# Patient Record
Sex: MALE | Race: WHITE
[De-identification: names, ages, dates, MRNs, and addresses within clinical notes are randomized per-mention and may not be internally consistent; named-entity substitution may affect disease eponyms.]

---

## 2017-02-03 ENCOUNTER — HOSPITAL ENCOUNTER (OUTPATIENT)
Dept: HOSPITAL 58 - LAB | Age: 27
Discharge: HOME | End: 2017-02-03
Attending: NURSE PRACTITIONER

## 2017-02-03 DIAGNOSIS — E11.9: Primary | ICD-10-CM

## 2017-02-03 PROCEDURE — 36415 COLL VENOUS BLD VENIPUNCTURE: CPT

## 2017-02-03 PROCEDURE — 83036 HEMOGLOBIN GLYCOSYLATED A1C: CPT

## 2017-09-06 ENCOUNTER — HOSPITAL ENCOUNTER (OUTPATIENT)
Dept: HOSPITAL 58 - LAB | Age: 27
Discharge: HOME | End: 2017-09-06
Attending: NURSE PRACTITIONER

## 2017-09-06 DIAGNOSIS — F32.9: ICD-10-CM

## 2017-09-06 DIAGNOSIS — E11.9: Primary | ICD-10-CM

## 2017-09-06 LAB
ALBUMIN SERPL-MCNC: 4.1 G/DL (ref 3.4–5)
ALBUMIN/GLOB SERPL: 1.28 {RATIO}
ALP SERPL-CCNC: 107 U/L (ref 50–136)
ALT SERPL-CCNC: 44 U/L (ref 12–78)
ANION GAP SERPL CALC-SCNC: 16.1 MMOL/L
AST SERPL-CCNC: 27 U/L (ref 15–37)
BILIRUB SERPL-MCNC: 0.34 MG/DL (ref 0–1.2)
BUN SERPL-MCNC: 9 MG/DL (ref 7–18)
BUN/CREAT SERPL: 10.97
CALCIUM SERPL-MCNC: 9.6 MG/DL (ref 8.2–10.2)
CHLORIDE SERPL-SCNC: 103 MMOL/L (ref 98–107)
CO2 BLD-SCNC: 26 MMOL/L (ref 21–32)
CREAT SERPL-MCNC: 0.82 MG/DL (ref 0.6–1.1)
GFR SERPLBLD BASED ON 1.73 SQ M-ARVRAT: 114 ML/MIN
GLOBULIN SER CALC-MCNC: 3.2 G/L
GLUCOSE SERPL-MCNC: 70 MG/DL (ref 70–100)
POTASSIUM SERPL-SCNC: 4.1 MMOL/L (ref 3.5–5.1)
PROT SERPL-MCNC: 7.3 G/DL (ref 6.4–8.2)
SODIUM SERPL-SCNC: 141 MMOL/L (ref 136–145)

## 2017-09-06 PROCEDURE — 36415 COLL VENOUS BLD VENIPUNCTURE: CPT

## 2017-09-06 PROCEDURE — 80053 COMPREHEN METABOLIC PANEL: CPT

## 2017-09-06 PROCEDURE — 83036 HEMOGLOBIN GLYCOSYLATED A1C: CPT

## 2017-11-29 ENCOUNTER — HOSPITAL ENCOUNTER (OUTPATIENT)
Dept: HOSPITAL 58 - RAD | Age: 27
Discharge: HOME | End: 2017-11-29
Attending: NURSE PRACTITIONER

## 2017-11-29 DIAGNOSIS — M79.672: Primary | ICD-10-CM

## 2017-11-29 NOTE — DI
EXAM: Three-view left foot 

  

COMPARISON: None 

  

HISTORY: Pain and swelling 

  

FINDINGS: There is no acute fracture or dislocation.  Alignment is anatomic.  Joint spaces are well p
reserved. There is a small calcaneal Achilles spur.  There is an incidental os peroneum seen. There m
ay be some minimal soft tissue swelling seen laterally. No unexpected radio-opaque foreign bodies. 

  

IMPRESSION: 

1.  No acute osseous abnormality. 

2.  Minimal soft tissue swelling laterally. 

3.  Small calcaneal Achilles spur.

## 2017-12-08 ENCOUNTER — HOSPITAL ENCOUNTER (OUTPATIENT)
Dept: HOSPITAL 58 - LAB | Age: 27
Discharge: HOME | End: 2017-12-08
Attending: NURSE PRACTITIONER

## 2017-12-08 DIAGNOSIS — E11.9: Primary | ICD-10-CM

## 2017-12-08 PROCEDURE — 36415 COLL VENOUS BLD VENIPUNCTURE: CPT

## 2017-12-08 PROCEDURE — 83036 HEMOGLOBIN GLYCOSYLATED A1C: CPT

## 2019-03-01 ENCOUNTER — HOSPITAL ENCOUNTER (OUTPATIENT)
Dept: HOSPITAL 58 - RHC-LAB | Age: 29
Discharge: HOME | End: 2019-03-01
Attending: NURSE PRACTITIONER

## 2019-03-01 DIAGNOSIS — E11.9: Primary | ICD-10-CM

## 2019-03-01 PROCEDURE — 80061 LIPID PANEL: CPT

## 2019-03-01 PROCEDURE — 85025 COMPLETE CBC W/AUTO DIFF WBC: CPT

## 2019-03-01 PROCEDURE — 80053 COMPREHEN METABOLIC PANEL: CPT

## 2019-03-01 PROCEDURE — 84681 ASSAY OF C-PEPTIDE: CPT

## 2019-03-01 PROCEDURE — 83036 HEMOGLOBIN GLYCOSYLATED A1C: CPT

## 2019-03-01 PROCEDURE — 36415 COLL VENOUS BLD VENIPUNCTURE: CPT

## 2019-03-26 ENCOUNTER — HOSPITAL ENCOUNTER (OUTPATIENT)
Dept: HOSPITAL 58 - LAB | Age: 29
Discharge: HOME | End: 2019-03-26
Attending: FAMILY MEDICINE

## 2019-03-26 DIAGNOSIS — E11.65: Primary | ICD-10-CM

## 2019-03-26 PROCEDURE — 83519 RIA NONANTIBODY: CPT

## 2019-03-26 PROCEDURE — 83525 ASSAY OF INSULIN: CPT

## 2019-03-26 PROCEDURE — 36415 COLL VENOUS BLD VENIPUNCTURE: CPT

## 2019-04-09 ENCOUNTER — HOSPITAL ENCOUNTER (OUTPATIENT)
Dept: HOSPITAL 58 - CAR | Age: 29
Discharge: HOME | End: 2019-04-09
Attending: FAMILY MEDICINE

## 2019-04-09 DIAGNOSIS — E11.9: Primary | ICD-10-CM

## 2019-04-09 DIAGNOSIS — I49.9: ICD-10-CM

## 2019-04-09 PROCEDURE — 93010 ELECTROCARDIOGRAM REPORT: CPT

## 2019-04-09 PROCEDURE — 93005 ELECTROCARDIOGRAM TRACING: CPT

## 2019-04-17 ENCOUNTER — HOSPITAL ENCOUNTER (OUTPATIENT)
Dept: HOSPITAL 58 - CAR | Age: 29
Discharge: HOME | End: 2019-04-17
Attending: FAMILY MEDICINE

## 2019-04-17 DIAGNOSIS — I48.91: Primary | ICD-10-CM

## 2019-04-19 NOTE — ECHOCOLOR
Date of Exam: 4/17/19   Ordering Physician: DR. CORTNEY HAN

Reason for Echo: ATRIAL FIBRILLATION





 M-Mode  Normal Adult Results LV Dimensions Normal Adult Results

 

AoV Opening excursions       >1.6  >1.6 LVEDD-base-    3.5-5.8  4.2

 

Ao root dimensions     2.0-3.7  3.2 LVESD-base-    3.1-4.6  

 

L. Atrium dimensions     1.9-3.8  4.0 Post. Wall thickness    0.8-1.1  1.0

 

IV septum (thickness)     0.7-1.2  1.1 Post. Wall excursion    0.72-1.3  NORMAL

 

Septal motion   NORMAL Systolic motion   

 

R. Ventricular cavity    1.5-2.0  NORMAL LVEF      60%  55%

 

Paradoxical septal wall motion   NORMAL    



2-D: 2-D M Mode Echocardiogram was performed using apical four chamber and left 
parasternal long and short axis views.  Mitral, tricuspid and aortic valves 
appear to be normal.  Contractility of the left ventricle seems to be normal, 
so is the cavity size.  Left atrial cavity size and aortic root appear to be 
normal.  There is no pericardial effusion.  There is no thrombus noted in the 
left ventricular or left aortic cavity.  No mitral valve prolapse noted. 

DOPPLER WITH COLOR FLOW: NORMAL VALVULAR FLOW INDICES--TRIVIAL MITRAL 
REGURGITATION AND MILD TRICUSPID REGURGITATION

M-MODE:

MV:  NORMAL

AV:  NORMAL

TV:  NORMAL

PV:  

CHAMBER SIZE:  BORDERLINE LEFT ATRIAL CAVITY ENLARGEMENT

WALL MOTION:  NORMAL

PERICARDIUM:  NORMAL

INTERPRETATION:  

1. NORMAL LEFT VENTRICULAR CONTRACTILITY

2. BORDERLINE LEFT ATRIAL CAVITY ENLARGEMENT

3. NORMAL VALVULAR FLOW INDICES

4. COLOR FLOW--TRIVIAL MITRAL REGURGITATION/'MILD TRICUSPID REGURGITATION
MTDD

## 2019-04-22 ENCOUNTER — HOSPITAL ENCOUNTER (OUTPATIENT)
Dept: HOSPITAL 58 - CAR | Age: 29
Discharge: HOME | End: 2019-04-22
Attending: FAMILY MEDICINE

## 2019-04-22 DIAGNOSIS — I48.91: Primary | ICD-10-CM

## 2019-04-22 PROCEDURE — 93227 XTRNL ECG REC<48 HR R&I: CPT

## 2019-04-24 ENCOUNTER — HOSPITAL ENCOUNTER (OUTPATIENT)
Dept: HOSPITAL 58 - RHC-LAB | Age: 29
Discharge: HOME | End: 2019-04-24
Attending: FAMILY MEDICINE

## 2019-04-24 DIAGNOSIS — I48.91: Primary | ICD-10-CM

## 2019-04-24 PROCEDURE — 36415 COLL VENOUS BLD VENIPUNCTURE: CPT

## 2019-04-24 PROCEDURE — 84443 ASSAY THYROID STIM HORMONE: CPT

## 2019-04-24 PROCEDURE — 84480 ASSAY TRIIODOTHYRONINE (T3): CPT

## 2019-04-24 PROCEDURE — 84439 ASSAY OF FREE THYROXINE: CPT

## 2019-04-24 NOTE — HOLTER
PATIENT INFORMATION AND COMMENTS

Attending Physician: DR. CORTNEY HAN



Indications:  UNSPECIFIED ATRIAL FIBRILLATION



________________________________________________________________________________
__

Patient Medications:  XARELTO, HUMULIN INSULIN, METFORMIN



________________________________________________________________________________
__

Pre-procedure Summary: 



Protocol:  Standard      Heart Rate         

Started: 4/22/19 1234      Minimum: 47 BPM   Weight: 285 LBS   

Ended: 4/23/19 1234      Maximum: 190 BPM   Height: 76"

Duration: 24 HOURS      Average:  104 BPM

________________________________________________________________________________
_

INTERPRETATIONS/OBSERVATIONS:

1. BASIC RHYTHM: ATRIAL FIBRILLATION, RATE 47 BPM  BPM, AVERAGE 104 BPM

2. RARE PVC'S

3. COUPLE OF RUNS OF RENETTA'S PHENOMENON NOTED, LASTING 6 TO 7 SECONDS

4. NO ST-T WAVE CHANGES FROM BASELINE

5. NO PAUSES GREATER THAN 2.0 SECONDS

6. ACTIVITY LOG NOT FILLED OUT





MTDD

## 2019-06-07 ENCOUNTER — HOSPITAL ENCOUNTER (OUTPATIENT)
Dept: HOSPITAL 58 - RHC-LAB | Age: 29
Discharge: HOME | End: 2019-06-07
Attending: FAMILY MEDICINE

## 2019-06-07 DIAGNOSIS — I48.2: ICD-10-CM

## 2019-06-07 DIAGNOSIS — E11.9: Primary | ICD-10-CM

## 2019-06-07 PROCEDURE — 85025 COMPLETE CBC W/AUTO DIFF WBC: CPT

## 2019-06-07 PROCEDURE — 83037 HB GLYCOSYLATED A1C HOME DEV: CPT

## 2019-06-07 PROCEDURE — 36415 COLL VENOUS BLD VENIPUNCTURE: CPT

## 2019-06-07 PROCEDURE — 82043 UR ALBUMIN QUANTITATIVE: CPT

## 2019-06-07 PROCEDURE — 80053 COMPREHEN METABOLIC PANEL: CPT

## 2019-09-06 ENCOUNTER — HOSPITAL ENCOUNTER (OUTPATIENT)
Dept: HOSPITAL 58 - RHC-LAB | Age: 29
Discharge: HOME | End: 2019-09-06
Attending: FAMILY MEDICINE

## 2019-09-06 DIAGNOSIS — E11.9: Primary | ICD-10-CM

## 2019-09-06 PROCEDURE — 80061 LIPID PANEL: CPT

## 2019-09-06 PROCEDURE — 83037 HB GLYCOSYLATED A1C HOME DEV: CPT

## 2019-09-06 PROCEDURE — 80053 COMPREHEN METABOLIC PANEL: CPT

## 2019-09-06 PROCEDURE — 36415 COLL VENOUS BLD VENIPUNCTURE: CPT

## 2019-09-06 PROCEDURE — 85025 COMPLETE CBC W/AUTO DIFF WBC: CPT
